# Patient Record
Sex: FEMALE | Race: ASIAN | Employment: FULL TIME | URBAN - METROPOLITAN AREA
[De-identification: names, ages, dates, MRNs, and addresses within clinical notes are randomized per-mention and may not be internally consistent; named-entity substitution may affect disease eponyms.]

---

## 2018-01-15 ENCOUNTER — OFFICE VISIT (OUTPATIENT)
Dept: INTERNAL MEDICINE CLINIC | Age: 38
End: 2018-01-15

## 2018-01-15 VITALS
BODY MASS INDEX: 33.13 KG/M2 | OXYGEN SATURATION: 98 % | DIASTOLIC BLOOD PRESSURE: 94 MMHG | WEIGHT: 187 LBS | HEIGHT: 63 IN | SYSTOLIC BLOOD PRESSURE: 149 MMHG | HEART RATE: 69 BPM | RESPIRATION RATE: 16 BRPM | TEMPERATURE: 98.5 F

## 2018-01-15 DIAGNOSIS — Z72.0 TOBACCO ABUSE: ICD-10-CM

## 2018-01-15 DIAGNOSIS — Z76.89 ESTABLISHING CARE WITH NEW DOCTOR, ENCOUNTER FOR: Primary | ICD-10-CM

## 2018-01-15 DIAGNOSIS — I10 ESSENTIAL HYPERTENSION, BENIGN: Chronic | ICD-10-CM

## 2018-01-15 DIAGNOSIS — H65.93 FLUID LEVEL BEHIND TYMPANIC MEMBRANE OF BOTH EARS: ICD-10-CM

## 2018-01-15 DIAGNOSIS — E11.8 TYPE 2 DIABETES MELLITUS WITH COMPLICATION, WITHOUT LONG-TERM CURRENT USE OF INSULIN (HCC): Chronic | ICD-10-CM

## 2018-01-15 DIAGNOSIS — R42 DIZZINESS: ICD-10-CM

## 2018-01-15 RX ORDER — FLUTICASONE PROPIONATE 50 MCG
2 SPRAY, SUSPENSION (ML) NASAL DAILY
Qty: 1 BOTTLE | Refills: 3 | Status: SHIPPED | OUTPATIENT
Start: 2018-01-15 | End: 2019-06-12

## 2018-01-15 RX ORDER — LISINOPRIL 10 MG/1
TABLET ORAL
Qty: 30 TAB | Refills: 5 | Status: SHIPPED | OUTPATIENT
Start: 2018-01-15

## 2018-01-15 RX ORDER — METFORMIN HYDROCHLORIDE 1000 MG/1
TABLET ORAL
COMMUNITY
Start: 2018-01-02 | End: 2018-01-15 | Stop reason: ALTCHOICE

## 2018-01-15 RX ORDER — GUAIFENESIN 600 MG/1
600 TABLET, EXTENDED RELEASE ORAL 2 TIMES DAILY
COMMUNITY
End: 2019-06-12

## 2018-01-15 NOTE — PROGRESS NOTES
Sebastien Kim is a 40 y.o. female who presents today for Establish Care and Dizziness (Started Tuesday the 9th. Comes and goes. Startes when she wakes up )  . She has a history of   Patient Active Problem List   Diagnosis Code    Type 2 diabetes mellitus with complication, without long-term current use of insulin [facility] E11.8    Essential hypertension, benign I10   . Today patient is here to establish care. No previous PCP since Dr. Yasmin Flores. Problem visit:  Sebastien Kim is here for complaint of dizziness. Problem began 10 day(s) ago. Severity is moderate  Character of problem: Noted that she woke up dizzy. Room was not spinning. No palpitations or feeling like heart was racing. Movement makes the problem worse as well as moving neck. Nothing makes the problem better, but this has improved with new pillow. Lasted for a couple hours and then resolved. Has changed her pillow and this has gotten better. Has had some congestion recently. No focal neurological issues. Diabetes: No longer taking victoza. Only on Metformin. Sees Dr. Wendie Henry for endo. Checks BG in the AM. Usually around 100. Notes that weight was better when on Victoza. May resume this once blood work comes back. HTN: on Labetalol due to pregnancy. Last child in 2015. No plans for more children. Pt's  has had vasectomy. Good control on ACEi previously. Social:  Smoking 2-3 cigs daily. Social EtOH. Works in IT. Two children, (7, 2)    ROS  Review of Systems   Constitutional: Negative for chills, fever and weight loss. HENT: Negative for ear discharge, ear pain, hearing loss and tinnitus. Eyes: Negative for blurred vision, double vision, photophobia, pain and discharge. Respiratory: Negative for cough, hemoptysis, sputum production and shortness of breath. Cardiovascular: Negative for chest pain, palpitations, orthopnea and leg swelling.    Gastrointestinal: Negative for abdominal pain, blood in stool, constipation, diarrhea, heartburn, nausea and vomiting. Genitourinary: Negative for dysuria, frequency, hematuria and urgency. Skin: Negative for itching and rash. Neurological: Positive for dizziness. Negative for tingling, tremors, sensory change, speech change, focal weakness and headaches. Endo/Heme/Allergies: Does not bruise/bleed easily. Psychiatric/Behavioral: Negative for depression. The patient is not nervous/anxious. Visit Vitals    BP (!) 149/94 (BP 1 Location: Left arm, BP Patient Position: Sitting)    Pulse 69    Temp 98.5 °F (36.9 °C) (Oral)    Resp 16    Ht 5' 3\" (1.6 m)    Wt 187 lb (84.8 kg)    SpO2 98%    BMI 33.13 kg/m2       Physical Exam   Constitutional: She is oriented to person, place, and time. She appears well-developed and well-nourished. HENT:   Head: Normocephalic and atraumatic. Fluid behind both TM. Cardiovascular: Normal rate and regular rhythm. No murmur heard. Pulmonary/Chest: Effort normal. No respiratory distress. Abdominal: Soft. Bowel sounds are normal. She exhibits no distension. There is no tenderness. Neurological: She is alert and oriented to person, place, and time. Virgil-hallpike negative. Skin: Skin is warm and dry. No open wounds. Skin normal.        Psychiatric: She has a normal mood and affect. Her behavior is normal.         Current Outpatient Prescriptions   Medication Sig    guaiFENesin ER (MUCINEX) 600 mg ER tablet Take 600 mg by mouth two (2) times a day.  fluticasone (FLONASE) 50 mcg/actuation nasal spray 2 Sprays by Both Nostrils route daily.  lisinopril (PRINIVIL, ZESTRIL) 10 mg tablet TAKE 1 TABLET BY MOUTH EVERY DAY    metFORMIN (GLUCOPHAGE) 1,000 mg tablet Take 1 Tab by mouth two (2) times daily (with meals) for 60 days.  FLUCELVAX QUAD 8239-7701, PF, syrg injection     metFORMIN (GLUCOPHAGE) 1,000 mg tablet Take 1 Tab by mouth two (2) times daily (with meals) for 30 days.      No current facility-administered medications for this visit. Past Medical History:   Diagnosis Date    Essential hypertension, benign 4/7/2011    Essential hypertension, benign     Type II or unspecified type diabetes mellitus without mention of complication, not stated as uncontrolled 4/7/2011      History reviewed. No pertinent surgical history. Social History   Substance Use Topics    Smoking status: Current Every Day Smoker     Types: Cigarettes    Smokeless tobacco: Never Used    Alcohol use Yes      Comment: occasionally      Family History   Problem Relation Age of Onset    Diabetes Mother     Heart Disease Mother     Hypertension Mother     Diabetes Father     Diabetes Brother     Cancer Maternal Aunt     Cancer Maternal Grandmother         No Known Allergies     Assessment/Plan  Diagnoses and all orders for this visit:    1. Establishing care with new doctor, encounter for - have reviewed some of her old records. 2. Type 2 diabetes mellitus with complication, without long-term current use of insulin ([facility] - notes good fasting. Only on metformin. Check blood work today. Will fax to Dr. Isaiah Thao.   -     LIPID PANEL  -     METABOLIC PANEL, COMPREHENSIVE  -     CBC WITH AUTOMATED DIFF  -     HEMOGLOBIN A1C WITH EAG  -     MICROALBUMIN, UR, RAND W/ MICROALBUMIN/CREA RATIO    3. Essential hypertension, benign - above goal. Done having children. Will switch back to ACEi. Stop BB. -     lisinopril (PRINIVIL, ZESTRIL) 10 mg tablet; TAKE 1 TABLET BY MOUTH EVERY DAY  -     METABOLIC PANEL, COMPREHENSIVE  -     CBC WITH AUTOMATED DIFF    4. Tobacco abuse - counseled. 5. Dizziness - no red flags. [place] Hallpike negative. + fluid behind TM. Try nasal steroids and mucinex. Stop BB. -     fluticasone (FLONASE) 50 mcg/actuation nasal spray; 2 Sprays by Both Nostrils route daily.  -     METABOLIC PANEL, COMPREHENSIVE  -     CBC WITH AUTOMATED DIFF  -     TSH 3RD GENERATION    6.  Fluid level behind tympanic membrane of both ears  -     fluticasone (FLONASE) 50 mcg/actuation nasal spray; 2 Sprays by Both Nostrils route daily.  -     METABOLIC PANEL, COMPREHENSIVE  -     CBC WITH AUTOMATED DIFF  -     TSH 3RD GENERATION        Follow-up Disposition: Not on File    Madi Velez MD  1/15/2018

## 2018-01-15 NOTE — PROGRESS NOTES
1. Have you been to the ER, urgent care clinic since your last visit? Hospitalized since your last visit? No    2. Have you seen or consulted any other health care providers outside of the [address] [place] [address] since your last visit? Include any pap smears or colon screening. Dr Huong Mcclain for Endocrinology. Dr. Rosario Ask for Eyes. Dr Kenna Oneill with [facility] for OBGYN, last PAP was 2016.

## 2018-01-15 NOTE — MR AVS SNAPSHOT
Visit Information Date & Time Provider Department Dept. Phone Encounter #  
 1/15/2018  9:30 AM Vance Do MD [facility] [address] [id number] Upcoming Health Maintenance Date Due MICROALBUMIN Q1 1/19/1990 Pneumococcal 19-64 Medium Risk (1 of 1 - PPSV23) 1/19/1999 LIPID PANEL Q1 4/29/2014 HEMOGLOBIN A1C Q6M 5/18/2014 FOOT EXAM Q1 9/16/2015 PAP AKA CERVICAL CYTOLOGY 8/14/2016 EYE EXAM RETINAL OR DILATED Q1 11/3/2018 DTaP/Tdap/Td series (2 - Td) 6/17/2024 Allergies as of 1/15/2018  Review Complete On: 5/91/2907 By: Ammon Triana No Known Allergies Current Immunizations  Reviewed on 6/17/2014 Name Date Influenza Vaccine 11/20/2013 Tdap 6/17/2014 Not reviewed this visit You Were Diagnosed With   
  
 Codes Comments Establishing care with new doctor, encounter for    -  Primary ICD-10-CM: Z76.89 
ICD-9-CM: V65.8 Type 2 diabetes mellitus with complication, without long-term current use of insulin (HCC)     ICD-10-CM: E11.8 ICD-9-CM: 250.90 Essential hypertension, benign     ICD-10-CM: I10 
ICD-9-CM: 401.1 Tobacco abuse     ICD-10-CM: Z72.0 ICD-9-CM: 305.1 Dizziness     ICD-10-CM: R08 ICD-9-CM: 780.4 Fluid level behind tympanic membrane of both ears     ICD-10-CM: H65.93 
ICD-9-CM: 381. 4 Vitals BP Pulse Temp Resp Height(growth percentile) Weight(growth percentile) (!) 149/94 (BP 1 Location: Left arm, BP Patient Position: Sitting) 69 98.5 °F (36.9 °C) (Oral) 16 5' 3\" (1.6 m) 187 lb (84.8 kg) LMP SpO2 BMI OB Status Smoking Status 01/04/2018 98% 33.13 kg/m2 Having regular periods Current Every Day Smoker Vitals History BMI and BSA Data Body Mass Index Body Surface Area  
 33.13 kg/m 2 1.94 m 2 Preferred Pharmacy Pharmacy Name Phone [id number], [address] [facility] [phone] Your Updated Medication List  
  
   
This list is accurate as of: 1/15/18 10:14 AM.  Always use your most recent med list.  
  
  
  
  
 FLUCELVAX QUAD 8946-2386 (PF) Syrg injection Generic drug:  influenza vaccine - (4 yrs+)(PF)  
  
 fluticasone 50 mcg/actuation nasal spray Commonly known as:  Estrada Remedies 2 Sprays by Both Nostrils route daily. lisinopril 10 mg tablet Commonly known as:  PRINIVIL, ZESTRIL  
TAKE 1 TABLET BY MOUTH EVERY DAY  
  
 * metFORMIN 1,000 mg tablet Commonly known as:  GLUCOPHAGE Take 1 Tab by mouth two (2) times daily (with meals) for 60 days. * metFORMIN 1,000 mg tablet Commonly known as:  GLUCOPHAGE Take 1 Tab by mouth two (2) times daily (with meals) for 30 days. MUCINEX 600 mg ER tablet Generic drug:  guaiFENesin ER Take 600 mg by mouth two (2) times a day. * Notice: This list has 2 medication(s) that are the same as other medications prescribed for you. Read the directions carefully, and ask your doctor or other care provider to review them with you. Prescriptions Sent to Pharmacy Refills  
 fluticasone (FLONASE) 50 mcg/actuation nasal spray 3 Si Sprays by Both Nostrils route daily. Class: Normal  
 Pharmacy: [facility] [address] Ph #: [phone] Route: Both Nostrils  
 lisinopril (PRINIVIL, ZESTRIL) 10 mg tablet 5 Sig: TAKE 1 TABLET BY MOUTH EVERY DAY Class: Normal  
 Pharmacy: [facility] [address] Ph #: [phone] We Performed the Following CBC WITH AUTOMATED DIFF [90084 CPT(R)] HEMOGLOBIN A1C WITH EAG [72309 CPT(R)] LIPID PANEL [77549 CPT(R)] METABOLIC PANEL, COMPREHENSIVE [60850 CPT(R)] MICROALBUMIN, UR, RAND W/ MICROALBUMIN/CREA RATIO [id number] CPT(R)] TSH 3RD GENERATION [63159 CPT(R)] Introducing [facility]!    
 Dear Bella Juarez: 
 Thank you for requesting a [employer] account. Our records indicate that you already have an active [employer] account. You can access your account anytime at [email or web address] Did you know that you can access your hospital and ER discharge instructions at any time in [employer]? You can also review all of your test results from your hospital stay or ER visit. Additional Information If you have questions, please visit the Frequently Asked Questions section of the [employer] website at [email or web address] Remember, [employer] is NOT to be used for urgent needs. For medical emergencies, dial 911. Now available from your iPhone and Android! Please provide this summary of care documentation to your next provider. Your primary care clinician is listed as Wallace Felix. If you have any questions after today's visit, please call [phone].

## 2018-01-16 LAB
ALBUMIN SERPL-MCNC: 4.1 G/DL (ref 3.5–5.5)
ALBUMIN/GLOB SERPL: 1.4 {RATIO} (ref 1.2–2.2)
ALP SERPL-CCNC: 40 IU/L (ref 39–117)
ALT SERPL-CCNC: 16 IU/L (ref 0–32)
AST SERPL-CCNC: 18 IU/L (ref 0–40)
BASOPHILS # BLD AUTO: 0 X10E3/UL (ref 0–0.2)
BASOPHILS NFR BLD AUTO: 1 %
BILIRUB SERPL-MCNC: 0.3 MG/DL (ref 0–1.2)
BUN SERPL-MCNC: 12 MG/DL (ref 6–20)
BUN/CREAT SERPL: 26 (ref 9–23)
CALCIUM SERPL-MCNC: 9.6 MG/DL (ref 8.7–10.2)
CHLORIDE SERPL-SCNC: 98 MMOL/L (ref 96–106)
CHOLEST SERPL-MCNC: 232 MG/DL (ref 100–199)
CO2 SERPL-SCNC: 25 MMOL/L (ref 18–29)
CREAT SERPL-MCNC: 0.46 MG/DL (ref 0.57–1)
EOSINOPHIL # BLD AUTO: 0.2 X10E3/UL (ref 0–0.4)
EOSINOPHIL NFR BLD AUTO: 3 %
ERYTHROCYTE [DISTWIDTH] IN BLOOD BY AUTOMATED COUNT: 16.1 % (ref 12.3–15.4)
EST. AVERAGE GLUCOSE BLD GHB EST-MCNC: 134 MG/DL
GLOBULIN SER CALC-MCNC: 3 G/DL (ref 1.5–4.5)
GLUCOSE SERPL-MCNC: 127 MG/DL (ref 65–99)
HBA1C MFR BLD: 6.3 % (ref 4.8–5.6)
HCT VFR BLD AUTO: 34.6 % (ref 34–46.6)
HDLC SERPL-MCNC: 62 MG/DL
HGB BLD-MCNC: 10.7 G/DL (ref 11.1–15.9)
IMM GRANULOCYTES # BLD: 0 X10E3/UL (ref 0–0.1)
IMM GRANULOCYTES NFR BLD: 0 %
INTERPRETATION, 910389: NORMAL
LDLC SERPL CALC-MCNC: 142 MG/DL (ref 0–99)
LYMPHOCYTES # BLD AUTO: 2.8 X10E3/UL (ref 0.7–3.1)
LYMPHOCYTES NFR BLD AUTO: 41 %
Lab: NORMAL
MCH RBC QN AUTO: 20.3 PG (ref 26.6–33)
MCHC RBC AUTO-ENTMCNC: 30.9 G/DL (ref 31.5–35.7)
MCV RBC AUTO: 66 FL (ref 79–97)
MONOCYTES # BLD AUTO: 0.4 X10E3/UL (ref 0.1–0.9)
MONOCYTES NFR BLD AUTO: 6 %
NEUTROPHILS # BLD AUTO: 3.4 X10E3/UL (ref 1.4–7)
NEUTROPHILS NFR BLD AUTO: 49 %
PLATELET # BLD AUTO: 182 X10E3/UL (ref 150–379)
POTASSIUM SERPL-SCNC: 4.2 MMOL/L (ref 3.5–5.2)
PROT SERPL-MCNC: 7.1 G/DL (ref 6–8.5)
RBC # BLD AUTO: 5.28 X10E6/UL (ref 3.77–5.28)
SODIUM SERPL-SCNC: 140 MMOL/L (ref 134–144)
TRIGL SERPL-MCNC: 140 MG/DL (ref 0–149)
TSH SERPL DL<=0.005 MIU/L-ACNC: 1.19 UIU/ML (ref 0.45–4.5)
VLDLC SERPL CALC-MCNC: 28 MG/DL (ref 5–40)
WBC # BLD AUTO: 6.8 X10E3/UL (ref 3.4–10.8)

## 2019-06-12 ENCOUNTER — OFFICE VISIT (OUTPATIENT)
Dept: INTERNAL MEDICINE CLINIC | Age: 39
End: 2019-06-12

## 2019-06-12 VITALS
SYSTOLIC BLOOD PRESSURE: 127 MMHG | RESPIRATION RATE: 18 BRPM | BODY MASS INDEX: 33.13 KG/M2 | DIASTOLIC BLOOD PRESSURE: 84 MMHG | OXYGEN SATURATION: 97 % | HEIGHT: 63 IN | TEMPERATURE: 98.3 F | HEART RATE: 104 BPM

## 2019-06-12 DIAGNOSIS — E11.8 TYPE 2 DIABETES MELLITUS WITH COMPLICATION, WITHOUT LONG-TERM CURRENT USE OF INSULIN (HCC): Primary | ICD-10-CM

## 2019-06-12 DIAGNOSIS — G47.9 SLEEP DISORDER: ICD-10-CM

## 2019-06-12 DIAGNOSIS — I10 ESSENTIAL HYPERTENSION, BENIGN: ICD-10-CM

## 2019-06-12 RX ORDER — TRAZODONE HYDROCHLORIDE 50 MG/1
50 TABLET ORAL
Qty: 60 TAB | Refills: 1 | Status: SHIPPED | OUTPATIENT
Start: 2019-06-12 | End: 2021-12-02 | Stop reason: ALTCHOICE

## 2019-06-12 RX ORDER — UREA 10 %
LOTION (ML) TOPICAL
COMMUNITY
End: 2021-12-02 | Stop reason: ALTCHOICE

## 2019-06-12 RX ORDER — MELOXICAM 15 MG/1
15 TABLET ORAL DAILY
COMMUNITY
End: 2021-12-02 | Stop reason: ALTCHOICE

## 2019-06-12 RX ORDER — OXYCODONE HYDROCHLORIDE 10 MG/1
TABLET ORAL
COMMUNITY
End: 2021-12-02 | Stop reason: ALTCHOICE

## 2019-06-12 NOTE — PROGRESS NOTES
HISTORY OF PRESENT ILLNESS  Aliyah Lee is a 44 y.o. female. HPI   Sleeping: Pt reports she slipped at a concert and tore her ACL and meniscus in February. She found out in April they were torn, and had a surgical procedure to fix them on May 2nd. She notes she has not been able to sleep since then. She only takes Oxycodone PRN at night, and Meloxicam to help with inflammation. Pt reports that she has been using melatonin to fall asleep, but she will wake in the middle of the night with minimal pain. She would have to take an oxycodone to help her sleep but would rather not have to take a narcotic to help her sleep. Hypertension ROS: taking medications as instructed, no medication side effects noted, no TIA's, no chest pain on exertion/SOB, no dyspnea on exertion, no swelling of ankles. New concerns: BP in office today is 127/84. Diabetic Review of Systems - medication compliance: compliant all of the time, diabetic diet compliance: compliant most of the time, home glucose monitoring: is performed regularly, further diabetic ROS: no polyuria or polydipsia, no chest pain, dyspnea or TIA's, no numbness, tingling or pain in extremities. Other symptoms and concerns: Pt's last A1C was 5.3, but she thinks her A1C will be higher because she cannot cook healthy meals herself and relies on her mother or  to cook them for her. Her BS last week was 110. Review of Systems   All other systems reviewed and are negative. Physical Exam   Constitutional: She is oriented to person, place, and time. She appears well-developed and well-nourished. HENT:   Head: Normocephalic and atraumatic. Right Ear: External ear normal.   Left Ear: External ear normal.   Nose: Nose normal.   Mouth/Throat: Oropharynx is clear and moist.   Eyes: Pupils are equal, round, and reactive to light. Conjunctivae and EOM are normal.   Neck: Normal range of motion. Neck supple.    Cardiovascular: Normal rate, regular rhythm, normal heart sounds and intact distal pulses. Pulmonary/Chest: Effort normal and breath sounds normal. Right breast exhibits no inverted nipple, no mass, no nipple discharge, no skin change and no tenderness. Left breast exhibits no inverted nipple, no mass, no nipple discharge, no skin change and no tenderness. No breast swelling, tenderness, discharge or bleeding. Breasts are symmetrical.   Abdominal: Soft. Bowel sounds are normal.   Genitourinary: Rectum normal and vagina normal. Rectal exam shows anal tone normal and guaiac negative stool. No breast swelling, tenderness, discharge or bleeding. Musculoskeletal:   Right leg in brace    Neurological: She is alert and oriented to person, place, and time. Skin: Skin is warm and dry. Psychiatric: She has a normal mood and affect. Her behavior is normal. Judgment and thought content normal.   Nursing note and vitals reviewed. ASSESSMENT and PLAN  Diagnoses and all orders for this visit:    1. Type 2 diabetes mellitus with complication, without long-term current use of insulin ([facility])  Sugars stable. Continue to follow diabetic diet and monitor sugars. 2. Essential hypertension, benign  BP is at goal. I do not recommend any change in medications. 3. Sleep disorder  Prescribed Trazodone. Dicussed that Ambiens possible side effects (sleep walking) would be dangerous for the pt to do post surgery, so we decided on Trazodone. -     traZODone (DESYREL) 50 mg tablet; Take 1 Tab by mouth nightly. 1-2 at night     Additional comments: Pt notes that she is no longer smoking anymore (since her surgery). Lab results and schedule of future lab studies reviewed with patient. Reviewed diet, exercise and weight control. Written by Antonio Galaviz, as dictated by Jessica Alicea MD.     Current diagnosis and concerns discussed with pt at length.  Understands risks and benefits or current treatment plan and medications and accepts the treatment and medication with any possible risks. Pt asks appropriate questions which were answered. Pt instructed to call with any concerns or problems. This note will not be viewable in [address]

## 2020-02-06 LAB — A1C %: 6.7 %

## 2021-08-16 NOTE — PROGRESS NOTES
Allen Mccain is a 39 y.o. female here for new patient appt for breast cancer. Referred by Dr. Spencer Miller    1. Have you been to the ER, urgent care clinic since your last visit? Hospitalized since your last visit? New Pt    2. Have you seen or consulted any other health care providers outside of the [address] [place] [address] since your last visit? Include any pap smears or colon screening.  New Pt

## 2021-08-17 ENCOUNTER — OFFICE VISIT (OUTPATIENT)
Dept: ONCOLOGY | Age: 41
End: 2021-08-17
Payer: COMMERCIAL

## 2021-08-17 ENCOUNTER — DOCUMENTATION ONLY (OUTPATIENT)
Dept: ONCOLOGY | Age: 41
End: 2021-08-17

## 2021-08-17 VITALS
HEIGHT: 63 IN | WEIGHT: 167.2 LBS | BODY MASS INDEX: 29.62 KG/M2 | TEMPERATURE: 98.4 F | DIASTOLIC BLOOD PRESSURE: 91 MMHG | OXYGEN SATURATION: 100 % | HEART RATE: 75 BPM | SYSTOLIC BLOOD PRESSURE: 144 MMHG

## 2021-08-17 DIAGNOSIS — D05.12 DUCTAL CARCINOMA IN SITU (DCIS) OF LEFT BREAST: Primary | ICD-10-CM

## 2021-08-17 PROCEDURE — 99244 OFF/OP CNSLTJ NEW/EST MOD 40: CPT | Performed by: INTERNAL MEDICINE

## 2021-08-17 RX ORDER — METFORMIN HYDROCHLORIDE 1000 MG/1
1000 TABLET ORAL 2 TIMES DAILY WITH MEALS
COMMUNITY

## 2021-08-17 RX ORDER — TAMOXIFEN CITRATE 10 MG/1
10 TABLET, FILM COATED ORAL EVERY OTHER DAY
Qty: 45 TABLET | Refills: 3 | Status: SHIPPED | OUTPATIENT
Start: 2021-08-17 | End: 2022-08-31

## 2021-08-17 NOTE — PROGRESS NOTES
[facility] at [address], [address] [place] [address]  W: [phone]  F: [phone]      Reason for Visit:   Chrissy Boss is a 39 y.o. female who is seen in consultation at the request of Dr. Carolyn Aly for evaluation of DCIS    Rad onc:  Dr. Kaylee Templeton    Treatment History:   · 21 L breast core bx:  DCIS, solid type, 1 mm, gr 2, ER + at 99%, NH + at 99%  · 7/15/21 L breast lumpectomy:  DCIS, 5-10 mm, gr 2, pTis pNx cM0  · 21 invitae genetic testing:  VUS MSH3; VUS SDHA    History of Present Illness: An abnormal mammogram led to the pathology above    FH:  Maternal aunt with breast cancer, paternal cousin with breast cancer; no ovarian, prostate, pancreas cancer    Past Medical History:   Diagnosis Date    Cancer [facility])     breast cancer    Essential hypertension, benign 2011    Essential hypertension, benign     Type II or unspecified type diabetes mellitus without mention of complication, not stated as uncontrolled 2011      History reviewed. No pertinent surgical history. Social History     Tobacco Use    Smoking status: Former Smoker     Types: Cigarettes     Quit date: 2019     Years since quittin.6    Smokeless tobacco: Never Used   Substance Use Topics    Alcohol use: Yes     Comment: occasionally      Family History   Problem Relation Age of Onset    Diabetes Mother     Heart Disease Mother     Hypertension Mother     Diabetes Father     Diabetes Brother     Cancer Maternal Aunt     Cancer Maternal Grandmother      Current Outpatient Medications   Medication Sig    metFORMIN (GLUCOPHAGE) 1,000 mg tablet Take 1,000 mg by mouth two (2) times daily (with meals).  tamoxifen (NOLVADEX) 10 mg tablet Take 1 Tablet by mouth every other day.  liraglutide (VICTOZA 2-FERNANDO) 0.6 mg/0.1 mL (18 mg/3 mL) pnij 0.6 mg by SubCUTAneous route.     lisinopril (PRINIVIL, ZESTRIL) 10 mg tablet TAKE 1 TABLET BY MOUTH EVERY DAY    melatonin 1 mg tablet Take  by mouth. (Patient not taking: Reported on 8/17/2021)    meloxicam (MOBIC) 15 mg tablet Take 15 mg by mouth daily. (Patient not taking: Reported on 8/17/2021)    oxyCODONE IR (ROXICODONE) 10 mg tab immediate release tablet Take  by mouth. (Patient not taking: Reported on 8/17/2021)    traZODone (DESYREL) 50 mg tablet Take 1 Tab by mouth nightly. 1-2 at night (Patient not taking: Reported on 8/17/2021)    metFORMIN (GLUCOPHAGE) 1,000 mg tablet Take 1 Tab by mouth two (2) times daily (with meals) for 30 days.  metFORMIN (GLUCOPHAGE) 1,000 mg tablet Take 1 Tab by mouth two (2) times daily (with meals) for 60 days. No current facility-administered medications for this visit. No Known Allergies     Review of Systems: A complete review of systems was obtained, negative except as described above.     Physical Exam:     Visit Vitals  BP (!) 144/91 (BP 1 Location: Left upper arm, BP Patient Position: Sitting)   Pulse 75   Temp 98.4 °F (36.9 °C) (Temporal)   Ht 5' 3\" (1.6 m)   Wt 167 lb 3.2 oz (75.8 kg)   SpO2 100%   BMI 29.62 kg/m²     ECOG PS: 0    Constitutional: Appears well-developed and well-nourished in no apparent distress      Mental status: Alert and awake, Oriented to person/place/time, Able to follow commands    Eyes: EOM normal, Sclera normal, No visible ocular discharge    HENT: Normocephalic, atraumatic    Mouth/Throat: Moist mucous membranes    External Ears normal    Neck: No visualized mass    Pulmonary/Chest: Respiratory effort normal, No visualized signs of difficulty breathing or respiratory distress    Musculoskeletal: Normal gait with no signs of ataxia, Normal range of motion of neck    Neurological: No facial asymmetry (Cranial nerve 7 motor function), No gaze palsy    Skin: No significant exanthematous lesions or discoloration noted on facial skin    Psychiatric: Normal affect, No hallucinations           Results:     Lab Results   Component Value Date/Time WBC 6.8 01/15/2018 12:00 AM    HGB 10.7 (L) 01/15/2018 12:00 AM    HCT 34.6 01/15/2018 12:00 AM    PLATELET 454 28/78/4632 12:00 AM    MCV 66 (L) 01/15/2018 12:00 AM    ABS. NEUTROPHILS 3.4 01/15/2018 12:00 AM     Lab Results   Component Value Date/Time    Sodium 140 01/15/2018 12:00 AM    Potassium 4.2 01/15/2018 12:00 AM    Chloride 98 01/15/2018 12:00 AM    CO2 25 01/15/2018 12:00 AM    Glucose 127 (H) 01/15/2018 12:00 AM    BUN 12 01/15/2018 12:00 AM    Creatinine 0.46 (L) 01/15/2018 12:00 AM    GFR est  01/15/2018 12:00 AM    GFR est non- 01/15/2018 12:00 AM    Calcium 9.6 01/15/2018 12:00 AM     Lab Results   Component Value Date/Time    Bilirubin, total 0.3 01/15/2018 12:00 AM    ALT (SGPT) 16 01/15/2018 12:00 AM    Alk. phosphatase 40 01/15/2018 12:00 AM    Protein, total 7.1 01/15/2018 12:00 AM    Albumin 4.1 01/15/2018 12:00 AM     6/11/21 MRI breast  10 mm area in L lateral breast, 8:00, 6cmfn    Records reviewed and summarized above. Pathology report(s) reviewed above. Radiology report(s) reviewed above. Assessment/plan:   1. Left DCIS, stage 0, ER +, MT +, grade 2:    The major issue for our consultation today was the use of tamoxifen in patients with DCIS that expresses estrogen and/or progesterone receptors. The following was discussed. The [employer] (nccn.org) guidelines suggest consideration of tamoxifen for women with DCIS treated with lumpectomy and radiation and who have hormone-receptor-positive tumors. Randomized trials in patients with DCIS suggest that tamoxifen would further reduce the risk of in-breast recurrence by about 30-40% in this patient, and decrease this patient's risk of contralateral breast cancer by about half. At present and from the literature, the risk of in-breast recurrence after radiation would probably be about 5-10 % at 10 years. Tamoxifen would decrease this risk by 30-40%.  Her risk of contralateral breast cancer is 0.5-1% a year, and tamoxifen would also decrease that risk by about half. About half of recurrences are invasive and half DCIS. However, from the data available, tamoxifen has not been associated with an improvement in survival.     Also, discussed the [facility] 2018 data with tamoxifen 10 mg every other day for 3 years with similar results to historical data. After this discussion, she is agreeable to tamoxifen 10 mg every other day, rx in. Follow-up after early breast cancer was discussed. I recommend follow-up as defined by the American Society of Clinical Oncology and [facility]. This includes a visit to a health care professional every 3-6 months for 3 years, then every 6-12 months for 2 years, and then yearly as well as mammograms yearly. 2. Emotional well being:  She has excellent support and is coping well with her disease    3. VUS MSH3 and SDHA:  Not clinically relevant at this time, has seen genetics    S/p covid19 vaccination    I appreciate the opportunity to participate in Ms. Gena Ibrahim's care. Signed By: Wilton Jolley MD      No orders of the defined types were placed in this encounter.

## 2021-08-17 NOTE — PROGRESS NOTES
Oncology Social Work  Psychosocial Assessment    Reason for Assessment:      [] Social Work Referral [x] Initial Assessment [x] New Diagnosis [] Other    Advance Care Planning: no amd  No flowsheet data found. Sources of Information:    [x]Patient  []Family  []Staff  []Medical Record    Mental Status:    [x]Alert  []Lethargic  []Unresponsive   [] Unable to assess   Oriented to:  [x]Person  [x]Place  [x]Time  [x]Situation      Relationship Status:  []Single  []  []Significant Other/Life Partner  []  [x]  []    Living Circumstances:  []Lives Alone  [x]Family/Significant Other in Household  []Roommates  [x]Children in the Home  []Paid Caregivers  []Assisted Living Facility/Group Home  []Skilled [address]  []Homeless  []Incarcerated  []Environmental/Care Concerns  []Other:    Employment Status:  [x]Employed Full-time []Employed Part-time []Homemaker  [] Retired [] Short-Term Disability [] [facility]  [] Unemployed     Barriers to Learning:    []Language  []Developmental  []Cognitive  []Altered Mental Status  []Visual/Hearing Impairment  []Unable to Read/Write  []Motivational  [] Challenges Understanding Medical Jargon [x]No Barriers Identified      Financial/Legal Concerns:    []Uninsured  []Limited Income/Resources  []Non-Citizen  []Food Insecurity [x]No Concerns Identified   []Other:    Shinto/Spiritual/Existential:  Does patient have any spiritual or Tenriism beliefs? [x] Yes [] No  Is the patient involved in a spiritual, ellie or Tenriism community?  [x] Yes [] No  Patient expressing spiritual/existential angst? [] Yes [x] No  Notes:    Support System:    Identified Support Person/Group: parents & siblings  [x]Strong  []Fair  []Limited    Coping with Illness:   [x]  Coping Well  [] Challenges Coping with Serious Illness [] Situational Depression [x] Situational Anxiety [] Anticipatory Grief  [] Recent Loss [] Caregiver [place]            Narrative: Patient here for consultation with Dr. Lincoln Jordan for the management of DCIS. Met with patient to introduce social work role and supports. She presents as pleasant, engaged and forthcoming with information. Patient and her children (ages 10 & 6) live temporarily with her parents in their private residence. She is . She works full-time as a . She has health insurance and prescription coverage. Provided active listening as patient ventilates feelings regarding her breast cancer diagnosis. She endorsed some anxiety and depression due to several life stressors including her breast cancer diagnosis, martial conflict and temporary living situation. She feels well supported by her parents and siblings with practical and emotional needs. She participates in individual psychotherapy. Her primary coping tools is exercise. Explored support groups for peer support as patient endorsed feelings of loneliness. Provided new patient folder with information on cancer support resources and services. Patient identified feeling like her life is \"on hold\". She is hopeful her appointment with Dr. Lincoln Jordan will provide some clarity and expectations regarding long term management. No ongoing barriers identified at this time. Plan: Ongoing psychosocial support as desired by patient.     Referral/Handouts:      Complementary therapies referral  Support Groups referral    Thank you,  Arvind Moreno LCSW

## 2021-11-23 ENCOUNTER — TELEPHONE (OUTPATIENT)
Dept: ONCOLOGY | Age: 41
End: 2021-11-23

## 2021-11-23 NOTE — TELEPHONE ENCOUNTER
[address] at [place] [address]  [phone]    11/23/2021--This user called and spoke with the patient, she stated that she has been taking the Tamoxifen for about 1 1/2 months and started noticing the \"puffiness\" in the past couple of weeks. Patient reported her ankles are swollen (Not her feet or toes) and there is some puffiness in her face and wrists. Patient stated that it is not causing her any issues, and that when she gets up in the morning she is fine, there is no swelling. Patient wants to know if she can take an OTC fluid pill for the retention. I let her know that I would consult with our team and give her a call back. Patient verbalized understanding. 11/23/2021 at 4:34 pm--This user attempted to call patient but she did not answer, so I left a detailed message with our recommendation's.

## 2021-11-23 NOTE — TELEPHONE ENCOUNTER
Patient called and stated she has been taking tamoxifen for a couple months and since starting this medication she has been having issues with water retention.  Please advise         CB# [phone]

## 2021-12-02 ENCOUNTER — OFFICE VISIT (OUTPATIENT)
Dept: INTERNAL MEDICINE CLINIC | Age: 41
End: 2021-12-02
Payer: COMMERCIAL

## 2021-12-02 VITALS
HEART RATE: 82 BPM | HEIGHT: 63 IN | RESPIRATION RATE: 14 BRPM | DIASTOLIC BLOOD PRESSURE: 88 MMHG | SYSTOLIC BLOOD PRESSURE: 138 MMHG | TEMPERATURE: 97.1 F | WEIGHT: 162 LBS | OXYGEN SATURATION: 99 % | BODY MASS INDEX: 28.7 KG/M2

## 2021-12-02 DIAGNOSIS — I10 ESSENTIAL HYPERTENSION, BENIGN: Chronic | ICD-10-CM

## 2021-12-02 DIAGNOSIS — M79.10 MYALGIA: ICD-10-CM

## 2021-12-02 DIAGNOSIS — R53.83 FATIGUE, UNSPECIFIED TYPE: ICD-10-CM

## 2021-12-02 DIAGNOSIS — R45.86 MOOD CHANGES: ICD-10-CM

## 2021-12-02 DIAGNOSIS — D05.12 DUCTAL CARCINOMA IN SITU (DCIS) OF LEFT BREAST: ICD-10-CM

## 2021-12-02 DIAGNOSIS — Z23 NEEDS FLU SHOT: ICD-10-CM

## 2021-12-02 DIAGNOSIS — E11.8 TYPE 2 DIABETES MELLITUS WITH COMPLICATION, WITHOUT LONG-TERM CURRENT USE OF INSULIN (HCC): ICD-10-CM

## 2021-12-02 DIAGNOSIS — Z00.00 WELLNESS EXAMINATION: Primary | ICD-10-CM

## 2021-12-02 LAB
ALBUMIN SERPL-MCNC: 3.7 G/DL (ref 3.5–5)
ALBUMIN/GLOB SERPL: 1 {RATIO} (ref 1.1–2.2)
ALP SERPL-CCNC: 31 U/L (ref 45–117)
ALT SERPL-CCNC: 24 U/L (ref 12–78)
ANION GAP SERPL CALC-SCNC: 9 MMOL/L (ref 5–15)
AST SERPL-CCNC: 14 U/L (ref 15–37)
BASOPHILS # BLD: 0.1 K/UL (ref 0–0.1)
BASOPHILS NFR BLD: 1 % (ref 0–1)
BILIRUB SERPL-MCNC: 0.4 MG/DL (ref 0.2–1)
BUN SERPL-MCNC: 11 MG/DL (ref 6–20)
BUN/CREAT SERPL: 20 (ref 12–20)
CALCIUM SERPL-MCNC: 9.2 MG/DL (ref 8.5–10.1)
CHLORIDE SERPL-SCNC: 105 MMOL/L (ref 97–108)
CO2 SERPL-SCNC: 26 MMOL/L (ref 21–32)
CREAT SERPL-MCNC: 0.56 MG/DL (ref 0.55–1.02)
CRP SERPL-MCNC: <0.29 MG/DL (ref 0–0.6)
DIFFERENTIAL METHOD BLD: ABNORMAL
EOSINOPHIL # BLD: 0.1 K/UL (ref 0–0.4)
EOSINOPHIL NFR BLD: 1 % (ref 0–7)
ERYTHROCYTE [DISTWIDTH] IN BLOOD BY AUTOMATED COUNT: 15.6 % (ref 11.5–14.5)
ERYTHROCYTE [SEDIMENTATION RATE] IN BLOOD: 10 MM/HR (ref 0–20)
GLOBULIN SER CALC-MCNC: 3.7 G/DL (ref 2–4)
GLUCOSE SERPL-MCNC: 105 MG/DL (ref 65–100)
HCT VFR BLD AUTO: 36.7 % (ref 35–47)
HGB BLD-MCNC: 11.3 G/DL (ref 11.5–16)
IMM GRANULOCYTES # BLD AUTO: 0 K/UL (ref 0–0.04)
IMM GRANULOCYTES NFR BLD AUTO: 0 % (ref 0–0.5)
LYMPHOCYTES # BLD: 2 K/UL (ref 0.8–3.5)
LYMPHOCYTES NFR BLD: 35 % (ref 12–49)
MCH RBC QN AUTO: 21.4 PG (ref 26–34)
MCHC RBC AUTO-ENTMCNC: 30.8 G/DL (ref 30–36.5)
MCV RBC AUTO: 69.6 FL (ref 80–99)
MONOCYTES # BLD: 0.3 K/UL (ref 0–1)
MONOCYTES NFR BLD: 6 % (ref 5–13)
NEUTS SEG # BLD: 3.2 K/UL (ref 1.8–8)
NEUTS SEG NFR BLD: 57 % (ref 32–75)
NRBC # BLD: 0 K/UL (ref 0–0.01)
NRBC BLD-RTO: 0 PER 100 WBC
PLATELET # BLD AUTO: 181 K/UL (ref 150–400)
POTASSIUM SERPL-SCNC: 4 MMOL/L (ref 3.5–5.1)
PROT SERPL-MCNC: 7.4 G/DL (ref 6.4–8.2)
RBC # BLD AUTO: 5.27 M/UL (ref 3.8–5.2)
RBC MORPH BLD: ABNORMAL
SODIUM SERPL-SCNC: 140 MMOL/L (ref 136–145)
TSH SERPL DL<=0.05 MIU/L-ACNC: 0.97 UIU/ML (ref 0.36–3.74)
WBC # BLD AUTO: 5.7 K/UL (ref 3.6–11)

## 2021-12-02 PROCEDURE — 99396 PREV VISIT EST AGE 40-64: CPT | Performed by: INTERNAL MEDICINE

## 2021-12-02 PROCEDURE — 90686 IIV4 VACC NO PRSV 0.5 ML IM: CPT | Performed by: INTERNAL MEDICINE

## 2021-12-02 PROCEDURE — 90471 IMMUNIZATION ADMIN: CPT | Performed by: INTERNAL MEDICINE

## 2021-12-02 NOTE — PATIENT INSTRUCTIONS
Well Visit, Ages 25 to 48: Care Instructions  Overview     Well visits can help you stay healthy. Your doctor has checked your overall health and may have suggested ways to take good care of yourself. Your doctor also may have recommended tests. At home, you can help prevent illness with healthy eating, regular exercise, and other steps. Follow-up care is a key part of your treatment and safety. Be sure to make and go to all appointments, and call your doctor if you are having problems. It's also a good idea to know your test results and keep a list of the medicines you take. How can you care for yourself at home? · Get screening tests that you and your doctor decide on. Screening helps find diseases before any symptoms appear. · Eat healthy foods. Choose fruits, vegetables, whole grains, protein, and low-fat dairy foods. Limit fat, especially saturated fat. Reduce salt in your diet. · Limit alcohol. If you are a man, have no more than 2 drinks a day or 14 drinks a week. If you are a woman, have no more than 1 drink a day or 7 drinks a week. · Get at least 30 minutes of physical activity on most days of the week. Walking is a good choice. You also may want to do other activities, such as running, swimming, cycling, or playing tennis or team sports. Discuss any changes in your exercise program with your doctor. · Reach and stay at a healthy weight. This will lower your risk for many problems, such as obesity, diabetes, heart disease, and high blood pressure. · Do not smoke or allow others to smoke around you. If you need help quitting, talk to your doctor about stop-smoking programs and medicines. These can increase your chances of quitting for good. · Care for your mental health. It is easy to get weighed down by worry and stress. Learn strategies to manage stress, like deep breathing and mindfulness, and stay connected with your family and community.  If you find you often feel sad or hopeless, talk with your doctor. Treatment can help. · Talk to your doctor about whether you have any risk factors for sexually transmitted infections (STIs). You can help prevent STIs if you wait to have sex with a new partner (or partners) until you've each been tested for STIs. It also helps if you use condoms (male or female condoms) and if you limit your sex partners to one person who only has sex with you. Vaccines are available for some STIs, such as HPV. · Use birth control if it's important to you to prevent pregnancy. Talk with your doctor about the choices available and what might be best for you. · If you think you may have a problem with alcohol or drug use, talk to your doctor. This includes prescription medicines (such as amphetamines and opioids) and illegal drugs (such as cocaine and methamphetamine). Your doctor can help you figure out what type of treatment is best for you. · Protect your skin from too much sun. When you're outdoors from 10 a.m. to 4 p.m., stay in the shade or cover up with clothing and a hat with a wide brim. Wear sunglasses that block UV rays. Even when it's cloudy, put broad-spectrum sunscreen (SPF 30 or higher) on any exposed skin. · See a dentist one or two times a year for checkups and to have your teeth cleaned. · Wear a seat belt in the car. When should you call for help? Watch closely for changes in your health, and be sure to contact your doctor if you have any problems or symptoms that concern you. Where can you learn more? Go to http://www.Bit9.com/  Enter P072 in the search box to learn more about \"Well Visit, Ages 25 to 48: Care Instructions. \"  Current as of: February 11, 2021               Content Version: 13.0  © 3769-1011 Healthwise, Incorporated. Care instructions adapted under license by [employer] (which disclaims liability or warranty for this information).  If you have questions about a medical condition or this instruction, always ask your healthcare professional. [place] [address] any warranty or liability for your use of this information.

## 2021-12-02 NOTE — PROGRESS NOTES
Pete Horta is a 39 y.o. female who presents today for Complete Physical  .      She has a history of   Patient Active Problem List   Diagnosis Code    Type 2 diabetes mellitus with complication, without long-term current use of insulin ([facility] E11.8    Essential hypertension, benign I10    Tobacco abuse Z72.0    Ductal carcinoma in situ (DCIS) of left breast D05.12   . Today patient is here for complete physical exam.. Hypertension: Patient has been on lisinopril for some time. Today's blood pressure is a bit elevated. Home readings are: not being checked. DCIS: Recently diagnosed with lefts sided breast cancer. Status post lumpectomy. Has met with oncologist and has been started on tamoxifen. Overall she reports that she is doing ok. Tamoxifen seems to be having some joint issues. Often in R hip. ? Emotional problems as s/e of tamoxifen. DM: Follows with endocrinologist Dr. Kiersten Oglesyb. Reports that last blood work was done in Feb, A1c at 5.1. Still helping with weight. Patient is planning on doing cool sculpting. She does need a CBC drawn for them. Mood is a bit low. Going through a separation and exercising less. Seeing a therapist.  Reports that though her mood is low at times she does not feel clinically depressed. Does want to get back into a regular exercise routine. Health maintenance hx includes:  Exercise: less active. Form of exercise: None currently  Diet: generally follows a low fat low cholesterol diet  Social: Has quit smoking. At home with two children and living with parents.  for [facility]. Screening:    Colon cancer screening: ? Needs early screening due to genetics. Breast cancer screening: last mammogram 2021    Cervical cancer screening: last PAP/Pelvic exam: 2021   and was normal Dr. Nigel Quiñones.     Osteoporosis screening: N/A      Immunizations:     Immunization History   Administered Date(s) Administered    COVID-19, PFIZER, MRNA, LNP-S, PF, 30MCG/0.3ML DOSE 03/17/2021, 04/08/2021    Influenza Vaccine 11/20/2013    Influenza Vaccine (Quad) Mdck Pf (>2 Yrs Flucelvax QUAD [id number]) 10/05/2018, 09/29/2020, 09/30/2020    Influenza Vaccine (Quad) PF (>6 Mo Flulaval, Fluarix, and >3 Yrs Dorla Canter [id number]) 12/02/2021    Tdap 06/17/2014      Immunization status: up to date and documented, flu today. ROS  Review of Systems   Constitutional: Negative for chills, fever and weight loss. HENT: Negative for congestion and sore throat. Eyes: Negative for blurred vision, double vision and photophobia. Respiratory: Negative for cough and shortness of breath. Cardiovascular: Negative for chest pain, palpitations and leg swelling. Gastrointestinal: Negative for abdominal pain, constipation, diarrhea, heartburn, nausea and vomiting. Genitourinary: Negative for dysuria, frequency and urgency. Musculoskeletal: Positive for joint pain and myalgias. Skin: Negative for rash. Neurological: Negative. Negative for headaches. Endo/Heme/Allergies: Does not bruise/bleed easily. Psychiatric/Behavioral: Negative for memory loss and suicidal ideas. Mood low at times       Visit Vitals  /88   Pulse 82   Temp 97.1 °F (36.2 °C) (Temporal)   Resp 14   Ht 5' 3\" (1.6 m)   Wt 162 lb (73.5 kg)   SpO2 99%   BMI 28.70 kg/m²       Physical Exam  Constitutional:       General: She is not in acute distress. Appearance: She is well-developed. HENT:      Head: Normocephalic and atraumatic. Neck:      Thyroid: No thyromegaly. Cardiovascular:      Rate and Rhythm: Normal rate and regular rhythm. Heart sounds: No murmur heard. Pulmonary:      Effort: Pulmonary effort is normal.      Breath sounds: Normal breath sounds. No wheezing. Abdominal:      General: Bowel sounds are normal. There is no distension. Palpations: Abdomen is soft. Musculoskeletal:      Cervical back: Normal range of motion and neck supple.    Skin: General: Skin is warm and dry. Neurological:      Mental Status: She is alert and oriented to person, place, and time. Cranial Nerves: No cranial nerve deficit. Psychiatric:         Behavior: Behavior normal.           Current Outpatient Medications   Medication Sig    metFORMIN (GLUCOPHAGE) 1,000 mg tablet Take 1,000 mg by mouth two (2) times daily (with meals).  tamoxifen (NOLVADEX) 10 mg tablet Take 1 Tablet by mouth every other day.  liraglutide (VICTOZA 2-FERNANDO) 0.6 mg/0.1 mL (18 mg/3 mL) pnij 0.6 mg by SubCUTAneous route.  lisinopril (PRINIVIL, ZESTRIL) 10 mg tablet TAKE 1 TABLET BY MOUTH EVERY DAY     No current facility-administered medications for this visit. Past Medical History:   Diagnosis Date    Cancer [facility])     breast cancer    Essential hypertension, benign 2011    Essential hypertension, benign     Type II or unspecified type diabetes mellitus without mention of complication, not stated as uncontrolled 2011      Past Surgical History:   Procedure Laterality Date    [place] BREAST SURGERY PROCEDURE UNLISTED Left     7/15/2021      Social History     Tobacco Use    Smoking status: Former Smoker     Types: Cigarettes     Quit date: 2019     Years since quittin.9    Smokeless tobacco: Never Used   Substance Use Topics    Alcohol use: Yes     Comment: occasionally      Family History   Problem Relation Age of Onset    Diabetes Mother     Heart Disease Mother     Hypertension Mother     Diabetes Father     Diabetes Brother     Cancer Maternal Aunt     Cancer Maternal Grandmother         No Known Allergies     Assessment/Plan  Diagnoses and all orders for this visit:    1. Wellness examination- Dilma Torres was counseled on age-appropriate/ guideline-based risk prevention behaviors and screening for a 39y.o. year old   female . We also discussed adjustments in screening based on family history if necessary.    Printed instructions for preventative screening guidelines and healthy behaviors given to patient with after visit summary.    -     SED RATE (ESR); Future  -     C REACTIVE PROTEIN, QT; Future  -     CBC WITH AUTOMATED DIFF; Future  -     METABOLIC PANEL, COMPREHENSIVE; Future  -     TSH 3RD GENERATION; Future  -     REFERRAL TO GASTROENTEROLOGY    2. Type 2 diabetes mellitus with complication, without long-term current use of insulin (HCC)-done through endocrinologist.  Labs of been stable    3. Essential hypertension, benign-borderline, patient to monitor this at home  -     [address], COMPREHENSIVE; Future    4. Ductal carcinoma in situ (DCIS) of left breast-status post lumpectomy. Is on tamoxifen. Questionable mood and musculoskeletal complaints with this medication. She will follow up with oncology after the new year. Encouraged her to increase her physical activity to see if both of these improve seeing a therapist.    5. Mood changes-overall reports that she is doing okay. 6. Myalgia  -     SED RATE (ESR); Future  -     C REACTIVE PROTEIN, QT; Future  -     CBC WITH AUTOMATED DIFF; Future    7. Fatigue, unspecified type  -     TSH 3RD GENERATION; Future    8. Needs flu shot  -     [address] [place] [address] ANDERSON Riddle MD  12/2/2021    This note was created with the help of speech recognition software Rico Moralez) and may contain some 'sound alike' errors.

## 2022-02-19 LAB — HBA1C MFR BLD HPLC: 5.8 %

## 2022-02-28 ENCOUNTER — VIRTUAL VISIT (OUTPATIENT)
Dept: ONCOLOGY | Age: 42
End: 2022-02-28
Payer: COMMERCIAL

## 2022-02-28 DIAGNOSIS — D05.12 DUCTAL CARCINOMA IN SITU (DCIS) OF LEFT BREAST: Primary | ICD-10-CM

## 2022-02-28 PROCEDURE — 99213 OFFICE O/P EST LOW 20 MIN: CPT | Performed by: INTERNAL MEDICINE

## 2022-02-28 NOTE — PROGRESS NOTES
[facility] [place] at [address], [address] [facility] [address] [facility]  W: [phone]  F: [phone]        Reason for Visit:   Alanna Monson is a 43 y.o. female who is seen by synchronous (real-time) audio-video technology for follow up of DCIS    Breast surgeon:  Dr. Shamar Escoto onc:  Dr. Willie Nice    Treatment History:   · 5/25/21 L breast core bx:  DCIS, solid type, 1 mm, gr 2, ER + at 99%, OH + at 99%  · 7/15/21 L breast lumpectomy:  DCIS, 5-10 mm, gr 2, pTis pNx cM0  · 6/16/21 invitae genetic testing:  VUS MSH3; VUS SDHA    History of Present Illness: An abnormal mammogram led to the pathology above    Interval history:  First few months on tamoxifen were \"rough. \"  Fatigue, joint pain, swelling. Improved now, but not at baseline    FH:  Maternal aunt with breast cancer, paternal cousin with breast cancer; no ovarian, prostate, pancreas cancer    Past Medical History:   Diagnosis Date    Cancer [facility])     breast cancer    Essential hypertension, benign 4/7/2011    Essential hypertension, benign     Type II or unspecified type diabetes mellitus without mention of complication, not stated as uncontrolled 4/7/2011      Past Surgical History:   Procedure Laterality Date    [place] BREAST SURGERY PROCEDURE UNLISTED Left     7/15/2021      Social History     Tobacco Use    Smoking status: Former Smoker     Types: Cigarettes     Quit date: 1/1/2019     Years since quitting: 3.1    Smokeless tobacco: Never Used   Substance Use Topics    Alcohol use: Yes     Comment: occasionally      Family History   Problem Relation Age of Onset    Diabetes Mother     Heart Disease Mother     Hypertension Mother     Diabetes Father     Diabetes Brother     Cancer Maternal Aunt     Cancer Maternal Grandmother      Current Outpatient Medications   Medication Sig    metFORMIN (GLUCOPHAGE) 1,000 mg tablet Take 1,000 mg by mouth two (2) times daily (with meals).     tamoxifen (NOLVADEX) 10 mg tablet Take 1 Tablet by mouth every other day.  liraglutide (VICTOZA 2-FERNANDO) 0.6 mg/0.1 mL (18 mg/3 mL) pnij 0.6 mg by SubCUTAneous route.  lisinopril (PRINIVIL, ZESTRIL) 10 mg tablet TAKE 1 TABLET BY MOUTH EVERY DAY     No current facility-administered medications for this visit. No Known Allergies     Review of Systems: A complete review of systems was obtained, negative except as described above. Physical Exam:     There were no vitals taken for this visit. ECOG PS: 0    General: alert, cooperative, no distress   Mental  status: normal mood, behavior, speech, dress, motor activity, and thought processes, able to follow commands   HENT: NCAT   Neck: no visualized mass   Resp: no respiratory distress   Neuro: no gross deficits   Skin: no discoloration or lesions of concern on visible areas   Psychiatric: normal affect, consistent with stated mood, no evidence of hallucinations       Due to this being a TeleHealth evaluation (During [facility]- public health emergency), many elements of the physical examination are unable to be assessed. Evaluation of the following organ systems was limited: Vitals/Constitutional/EENT/Resp/CV/GI//MS/Neuro/Skin/Heme-Lymph-Imm. Results:     Lab Results   Component Value Date/Time    WBC 5.7 12/02/2021 10:56 AM    HGB 11.3 (L) 12/02/2021 10:56 AM    HCT 36.7 12/02/2021 10:56 AM    PLATELET 998 54/03/0109 10:56 AM    MCV 69.6 (L) 12/02/2021 10:56 AM    ABS.  NEUTROPHILS 3.2 12/02/2021 10:56 AM     Lab Results   Component Value Date/Time    Sodium 140 12/02/2021 10:56 AM    Potassium 4.0 12/02/2021 10:56 AM    Chloride 105 12/02/2021 10:56 AM    CO2 26 12/02/2021 10:56 AM    Glucose 105 (H) 12/02/2021 10:56 AM    BUN 11 12/02/2021 10:56 AM    Creatinine 0.56 12/02/2021 10:56 AM    GFR est AA >60 12/02/2021 10:56 AM    GFR est non-AA >60 12/02/2021 10:56 AM    Calcium 9.2 12/02/2021 10:56 AM     Lab Results   Component Value Date/Time    Bilirubin, total 0.4 12/02/2021 10:56 AM    ALT (SGPT) 24 12/02/2021 10:56 AM    Alk. phosphatase 31 (L) 12/02/2021 10:56 AM    Protein, total 7.4 12/02/2021 10:56 AM    Albumin 3.7 12/02/2021 10:56 AM    Globulin 3.7 12/02/2021 10:56 AM     6/11/21 MRI breast  10 mm area in L lateral breast, 8:00, 6cmfn    Records reviewed and summarized above. Pathology report(s) reviewed above. Radiology report(s) reviewed above. Assessment/plan:   1. Left DCIS, stage 0, ER +, AL +, grade 2:    The major issue for our consultation today was the use of tamoxifen in patients with DCIS that expresses estrogen and/or progesterone receptors. The following was discussed. The [employer] (nccn.org) guidelines suggest consideration of tamoxifen for women with DCIS treated with lumpectomy and radiation and who have hormone-receptor-positive tumors. Randomized trials in patients with DCIS suggest that tamoxifen would further reduce the risk of in-breast recurrence by about 30-40% in this patient, and decrease this patient's risk of contralateral breast cancer by about half. At present and from the literature, the risk of in-breast recurrence after radiation would probably be about 5-10 % at 10 years. Tamoxifen would decrease this risk by 30-40%. Her risk of contralateral breast cancer is 0.5-1% a year, and tamoxifen would also decrease that risk by about half. About half of recurrences are invasive and half DCIS. However, from the data available, tamoxifen has not been associated with an improvement in survival.     Also, discussed the [facility] 2018 data with tamoxifen 10 mg every other day for 3 years with similar results to historical data. After this discussion, she is agreeable to tamoxifen 10 mg every other day, rx in. Taking tamoxifen as above, was having terrible fatigue and joint pain, but those have improved. She would like to continue taking tamoxifen for now.     Sees Dr. Carlos Lainez again with mammogram in May 2022    Follow-up after early breast cancer was discussed. I recommend follow-up as defined by the American Society of Clinical Oncology and [facility] [[facility]]. This includes a visit to a health care professional every 3-6 months for 3 years, then every 6-12 months for 2 years, and then yearly as well as mammograms yearly. 2. Emotional well being:  She has excellent support and is coping well with her disease    3. VUS MSH3 and SDHA:  Not clinically relevant at this time, has seen genetics    S/p covid19 vaccination    The patient was evaluated through a synchronous (real-time) audio-video encounter. The patient (or guardian if applicable) is aware that this is a billable service, which includes applicable co-pays. This Virtual Visit was conducted with patient's (and/or legal guardian's) consent. The visit was conducted pursuant to the emergency declaration under the [address] [place] [address] authority and the [employer] and [employer] General Act. Patient identification was verified, and a caregiver was present when appropriate. The patient was located in a state where the provider was licensed to provide care. I appreciate the opportunity to participate in Ms. Gena Ibrahim's care. Signed By: Luci Gagnon MD      No orders of the defined types were placed in this encounter.

## 2022-03-19 PROBLEM — Z72.0 TOBACCO ABUSE: Status: ACTIVE | Noted: 2018-01-15

## 2022-03-20 PROBLEM — D05.12 DUCTAL CARCINOMA IN SITU (DCIS) OF LEFT BREAST: Status: ACTIVE | Noted: 2021-12-02

## 2022-05-26 ENCOUNTER — OFFICE VISIT (OUTPATIENT)
Dept: INTERNAL MEDICINE CLINIC | Age: 42
End: 2022-05-26
Payer: COMMERCIAL

## 2022-05-26 VITALS
WEIGHT: 169.4 LBS | HEIGHT: 63 IN | DIASTOLIC BLOOD PRESSURE: 73 MMHG | HEART RATE: 78 BPM | OXYGEN SATURATION: 98 % | RESPIRATION RATE: 18 BRPM | TEMPERATURE: 98.9 F | BODY MASS INDEX: 30.02 KG/M2 | SYSTOLIC BLOOD PRESSURE: 107 MMHG

## 2022-05-26 DIAGNOSIS — D05.12 DUCTAL CARCINOMA IN SITU (DCIS) OF LEFT BREAST: ICD-10-CM

## 2022-05-26 DIAGNOSIS — R41.840 ATTENTION DEFICIT: Primary | ICD-10-CM

## 2022-05-26 DIAGNOSIS — E11.8 TYPE 2 DIABETES MELLITUS WITH COMPLICATION, WITHOUT LONG-TERM CURRENT USE OF INSULIN (HCC): ICD-10-CM

## 2022-05-26 DIAGNOSIS — I10 ESSENTIAL HYPERTENSION, BENIGN: ICD-10-CM

## 2022-05-26 PROCEDURE — 99214 OFFICE O/P EST MOD 30 MIN: CPT | Performed by: INTERNAL MEDICINE

## 2022-05-26 NOTE — PATIENT INSTRUCTIONS
Dr. Wanda Mancini of [address] [address], [address]  [facility]          [phone] Medication:   Requested Prescriptions     Pending Prescriptions Disp Refills    montelukast (SINGULAIR) 10 MG tablet 30 tablet 2     Sig: TAKE ONE TABLET BY MOUTH DAILY     Last Filled:  10/7/21    Last appt: 11/15/2021   Next appt: Visit date not found    Last OARRS: No flowsheet data found.

## 2022-05-26 NOTE — PROGRESS NOTES
Nino Carranza  Identified pt with two pt identifiers(name and ). Chief Complaint   Patient presents with    Attention Deficit Disorder     RM19// pt presents today to discuss having ADD is having trouble concentrating       1. Have you been to the ER, urgent care clinic since your last visit? Hospitalized since your last visit? NO    2. Have you seen or consulted any other health care providers outside of the [address] [place] [address] since your last visit? Include any pap smears or colon screening. NO      Provider notified of reason for visit, vitals and flowsheets obtained on patients.      Patient received paperwork for advance directive during previous visit but has not completed at this time     Reviewed record In preparation for visit, huddled with provider and have obtained necessary documentation      Health Maintenance Due   Topic    Pneumococcal 0-64 years (1 - PCV)    MICROALBUMIN Q1     Breast Cancer Screen Mammogram     Lipid Screen        Wt Readings from Last 3 Encounters:   22 169 lb 6.4 oz (76.8 kg)   21 162 lb (73.5 kg)   21 167 lb 3.2 oz (75.8 kg)     Temp Readings from Last 3 Encounters:   22 98.9 °F (37.2 °C) (Oral)   21 97.1 °F (36.2 °C) (Temporal)   21 98.4 °F (36.9 °C) (Temporal)     BP Readings from Last 3 Encounters:   22 107/73   21 138/88   21 (!) 144/91     Pulse Readings from Last 3 Encounters:   22 78   21 82   21 75     Vitals:    22 1125   BP: 107/73   Pulse: 78   Resp: 18   Temp: 98.9 °F (37.2 °C)   TempSrc: Oral   SpO2: 98%   Weight: 169 lb 6.4 oz (76.8 kg)   Height: 5' 3\" (1.6 m)   PainSc:   0 - No pain   LMP: 2022         Learning Assessment:  :     Learning Assessment 2021   PRIMARY LEARNER Patient   PRIMARY LANGUAGE ENGLISH   LEARNER PREFERENCE PRIMARY LISTENING   ANSWERED BY patient   RELATIONSHIP SELF       Depression Screening:  :     3 most recent PHQ Screens 2022   Little interest or pleasure in doing things Not at all   Feeling down, depressed, irritable, or hopeless Not at all   Total Score PHQ 2 0       Fall Risk Assessment:  :     Fall Risk Assessment, last 12 mths 1/15/2018   Able to walk? Yes   Fall in past 12 months? No       Abuse Screening:  :     Abuse Screening Questionnaire 1/15/2018   Do you ever feel afraid of your partner? N   Are you in a relationship with someone who physically or mentally threatens you? N   Is it safe for you to go home? Y       ADL Screening:  :     ADL Assessment 12/2/2021   Feeding yourself No Help Needed   Getting from bed to chair No Help Needed   Getting dressed No Help Needed   Bathing or showering No Help Needed   Walk across the room (includes cane/walker) No Help Needed   Using the telphone No Help Needed   Taking your medications No Help Needed   Preparing meals No Help Needed   Managing money (expenses/bills) No Help Needed   Moderately strenuous housework (laundry) No Help Needed   Shopping for personal items (toiletries/medicines) No Help Needed   Shopping for groceries No Help Needed   Driving No Help Needed   Climbing a flight of stairs No Help Needed   Getting to places beyond walking distances No Help Needed         Medication reconciliation up to date and corrected with patient at this time.

## 2022-05-26 NOTE — PROGRESS NOTES
Danitza Torres is a 43 y.o. female who presents today for Attention Deficit Disorder (RM19// pt presents today to discuss having ADD is having trouble concentrating)  . She has a history of   Patient Active Problem List   Diagnosis Code    Type 2 diabetes mellitus with complication, without long-term current use of insulin (HCC) E11.8    Essential hypertension, benign I10    Tobacco abuse Z72.0    Ductal carcinoma in situ (DCIS) of left breast D05.12   . Today patient is here for an acute visit. .     Patient is concerned regarding her attention span. She feels as though she is not able to pay attention to things and stay on task. She wonders about ADD. Reports she has mild been tested for ADD. We did check thyroid studies late last year which were normal.  Patient does have ADD in the family. She reports that this was not a problem during her previous job but did not require a lot of concentration, but her job over the last 2 years has been difficult for her due to lack of concentration. CBC and CMP were also both normal with the exception of very mild anemia. .    Hypertension- Has been variable. Today blood pressure is well controlled. Hypertension ROS: taking medications as instructed, no medication side effects noted, no TIA's, no chest pain on exertion, no dyspnea on exertion, no swelling of ankles     reports that she quit smoking about 3 years ago. Her smoking use included cigarettes. She has never used smokeless tobacco.    reports current alcohol use. BP Readings from Last 2 Encounters:   05/26/22 107/73   12/02/21 138/88     Diabetes: Patient is seeing Dr. Alexandre Stone. A1c was 5.8 in February of this year. She remains on Victoza. Her lipid panel was also done at that time and her LDL was at 119. DCIS: MMG is UTD. Was just done this week. Has decided to continue tamoxifen for time being. Continues to follow with oncology.     ROS  Review of Systems   Constitutional: Negative for chills, fever and weight loss. HENT: Negative for congestion and sore throat. Eyes: Negative for blurred vision, double vision and photophobia. Respiratory: Negative for cough and shortness of breath. Cardiovascular: Negative for chest pain, palpitations and leg swelling. Gastrointestinal: Negative for abdominal pain, constipation, diarrhea, heartburn, nausea and vomiting. Genitourinary: Negative for dysuria, frequency and urgency. Musculoskeletal: Negative for joint pain and myalgias. Skin: Negative for rash. Neurological: Negative. Negative for headaches. Endo/Heme/Allergies: Does not bruise/bleed easily. Psychiatric/Behavioral: Negative for memory loss and suicidal ideas. Attention problems. Visit Vitals  /73 (BP 1 Location: Left upper arm, BP Patient Position: Sitting, BP Cuff Size: Large adult)   Pulse 78   Temp 98.9 °F (37.2 °C) (Oral)   Resp 18   Ht 5' 3\" (1.6 m)   Wt 169 lb 6.4 oz (76.8 kg)   SpO2 98%   BMI 30.01 kg/m²       Physical Exam  Constitutional:       Appearance: She is well-developed. HENT:      Head: Normocephalic and atraumatic. Cardiovascular:      Rate and Rhythm: Normal rate and regular rhythm. Heart sounds: No murmur heard. Pulmonary:      Effort: Pulmonary effort is normal. No respiratory distress. Skin:     General: Skin is warm and dry. Neurological:      Mental Status: She is alert and oriented to person, place, and time. Psychiatric:         Behavior: Behavior normal.           Current Outpatient Medications   Medication Sig    metFORMIN (GLUCOPHAGE) 1,000 mg tablet Take 1,000 mg by mouth two (2) times daily (with meals).  tamoxifen (NOLVADEX) 10 mg tablet Take 1 Tablet by mouth every other day.  liraglutide (VICTOZA 2-FERNANDO) 0.6 mg/0.1 mL (18 mg/3 mL) pnij 0.6 mg by SubCUTAneous route.     lisinopril (PRINIVIL, ZESTRIL) 10 mg tablet TAKE 1 TABLET BY MOUTH EVERY DAY     No current facility-administered medications for this visit. Past Medical History:   Diagnosis Date    Cancer [facility])     breast cancer    Essential hypertension, benign 4/7/2011    Essential hypertension, benign     Type II or unspecified type diabetes mellitus without mention of complication, not stated as uncontrolled 4/7/2011      Past Surgical History:   Procedure Laterality Date    [place] BREAST SURGERY PROCEDURE UNLISTED Left     7/15/2021      Social History     Tobacco Use    Smoking status: Former Smoker     Types: Cigarettes     Quit date: 1/1/2019     Years since quitting: 3.4    Smokeless tobacco: Never Used   Substance Use Topics    Alcohol use: Yes     Comment: occasionally      Family History   Problem Relation Age of Onset    Diabetes Mother     Heart Disease Mother     Hypertension Mother     Diabetes Father     Diabetes Brother     Cancer Maternal Aunt     Cancer Maternal Grandmother         No Known Allergies     Assessment/Plan  Diagnoses and all orders for this visit:    1. Attention deficit-patient having difficulty staying on task with her new job. This is causing some problems at work. She does have a family history of ADD. She would like to have some formal neuropsych evaluation. Will refer to [facility] for neuropsych testing.  -     REFERRAL TO NEUROPSYCHOLOGY    2. Type 2 diabetes mellitus with complication, without long-term current use of insulin (HCC)-monitored through endocrine. Will be seeing them soon. Very well controlled    3. Ductal carcinoma in situ (DCIS) of left breast-now tolerating tamoxifen. 4. Essential hypertension, benign-has been quite variable recently. Suggest that she checks this at home regularly and let us know if her average is above 135/85. Tessy Reddy MD  5/26/2022    This note was created with the help of speech recognition software Jayashree Ledbetter) and may contain some 'sound alike' errors.

## 2022-08-28 DIAGNOSIS — D05.12 DUCTAL CARCINOMA IN SITU (DCIS) OF LEFT BREAST: ICD-10-CM

## 2022-08-31 RX ORDER — TAMOXIFEN CITRATE 10 MG/1
10 TABLET, FILM COATED ORAL EVERY OTHER DAY
Qty: 45 TABLET | Refills: 3 | Status: SHIPPED | OUTPATIENT
Start: 2022-08-31

## 2022-09-12 ENCOUNTER — OFFICE VISIT (OUTPATIENT)
Dept: INTERNAL MEDICINE CLINIC | Age: 42
End: 2022-09-12
Payer: COMMERCIAL

## 2022-09-12 VITALS
OXYGEN SATURATION: 97 % | HEIGHT: 63 IN | WEIGHT: 164 LBS | TEMPERATURE: 99.3 F | SYSTOLIC BLOOD PRESSURE: 121 MMHG | DIASTOLIC BLOOD PRESSURE: 86 MMHG | RESPIRATION RATE: 16 BRPM | HEART RATE: 75 BPM | BODY MASS INDEX: 29.06 KG/M2

## 2022-09-12 DIAGNOSIS — E11.8 TYPE 2 DIABETES MELLITUS WITH COMPLICATION, WITHOUT LONG-TERM CURRENT USE OF INSULIN (HCC): ICD-10-CM

## 2022-09-12 DIAGNOSIS — D05.12 DUCTAL CARCINOMA IN SITU (DCIS) OF LEFT BREAST: ICD-10-CM

## 2022-09-12 DIAGNOSIS — I10 ESSENTIAL HYPERTENSION, BENIGN: ICD-10-CM

## 2022-09-12 DIAGNOSIS — Z01.818 PREOP EXAMINATION: Primary | ICD-10-CM

## 2022-09-12 PROCEDURE — 93000 ELECTROCARDIOGRAM COMPLETE: CPT | Performed by: INTERNAL MEDICINE

## 2022-09-12 PROCEDURE — 99214 OFFICE O/P EST MOD 30 MIN: CPT | Performed by: INTERNAL MEDICINE

## 2022-09-12 RX ORDER — SEMAGLUTIDE 1.34 MG/ML
INJECTION, SOLUTION SUBCUTANEOUS
COMMUNITY
Start: 2022-07-06

## 2022-09-12 NOTE — PROGRESS NOTES
Kathryn Sharma  Identified pt with two pt identifiers(name and ). Chief Complaint   Patient presents with    Pre-op Exam     RM18// pt presenting today to have blood work and an EKG to schedule Tubal Ligation and D&C        1. Have you been to the ER, urgent care clinic since your last visit? Hospitalized since your last visit? NO    2. Have you seen or consulted any other health care providers outside of the [address] [place] [address] since your last visit? Include any pap smears or colon screening. NO      Provider notified of reason for visit, vitals and flowsheets obtained on patients.      Patient received paperwork for advance directive during previous visit but has not completed at this time     Reviewed record In preparation for visit, huddled with provider and have obtained necessary documentation      Health Maintenance Due   Topic    Pneumococcal 0-64 years (1 - PCV)    MICROALBUMIN Q1     Breast Cancer Screen Mammogram     Lipid Screen     Flu Vaccine (1)       Wt Readings from Last 3 Encounters:   22 164 lb (74.4 kg)   22 169 lb 6.4 oz (76.8 kg)   21 162 lb (73.5 kg)     Temp Readings from Last 3 Encounters:   22 99.3 °F (37.4 °C) (Oral)   22 98.9 °F (37.2 °C) (Oral)   21 97.1 °F (36.2 °C) (Temporal)     BP Readings from Last 3 Encounters:   22 121/86   22 107/73   21 138/88     Pulse Readings from Last 3 Encounters:   22 75   22 78   21 82     Vitals:    22 1319   BP: 121/86   Pulse: 75   Resp: 16   Temp: 99.3 °F (37.4 °C)   TempSrc: Oral   SpO2: 97%   Weight: 164 lb (74.4 kg)   Height: 5' 3\" (1.6 m)   PainSc:   0 - No pain   LMP: 2022         Learning Assessment:  :     Learning Assessment 2021   PRIMARY LEARNER Patient   PRIMARY LANGUAGE ENGLISH   LEARNER PREFERENCE PRIMARY LISTENING   ANSWERED BY patient   RELATIONSHIP SELF       Depression Screening:  :     3 most recent PHQ Screens 2022   Little interest or pleasure in doing things Not at all   Feeling down, depressed, irritable, or hopeless Not at all   Total Score PHQ 2 0       Fall Risk Assessment:  :     Fall Risk Assessment, last 12 mths 1/15/2018   Able to walk? Yes   Fall in past 12 months? No       Abuse Screening:  :     Abuse Screening Questionnaire 1/15/2018   Do you ever feel afraid of your partner? N   Are you in a relationship with someone who physically or mentally threatens you? N   Is it safe for you to go home? Y       ADL Screening:  :     ADL Assessment 12/2/2021   Feeding yourself No Help Needed   Getting from bed to chair No Help Needed   Getting dressed No Help Needed   Bathing or showering No Help Needed   Walk across the room (includes cane/walker) No Help Needed   Using the telphone No Help Needed   Taking your medications No Help Needed   Preparing meals No Help Needed   Managing money (expenses/bills) No Help Needed   Moderately strenuous housework (laundry) No Help Needed   Shopping for personal items (toiletries/medicines) No Help Needed   Shopping for groceries No Help Needed   Driving No Help Needed   Climbing a flight of stairs No Help Needed   Getting to places beyond walking distances No Help Needed         Medication reconciliation up to date and corrected with patient at this time.

## 2022-09-12 NOTE — PROGRESS NOTES
Jerman Little is a 43 y.o. female who presents today for Pre-op Exam (RM18// pt presenting today to have blood work and an EKG to schedule Tubal Ligation and D&C )  . She has a history of   Patient Active Problem List   Diagnosis Code    Type 2 diabetes mellitus with complication, without long-term current use of insulin ([facility] [address] E11.8    Essential hypertension, benign I10    Tobacco abuse Z72.0    Ductal carcinoma in situ (DCIS) of left breast D05.12   . Today patient is here for preop evaluation. she does not have other concerns. Patient will be having a bilateral tubal ligation and D&C done at their surgical center by Genoveva Stokes. They are requesting a preop EKG and blood work. No problems with anesthesia in the past.     Hypertension- very well controlled. Hypertension ROS: taking medications as instructed, no medication side effects noted, no TIA's, no chest pain on exertion, no dyspnea on exertion, no swelling of ankles     reports that she quit smoking about 3 years ago. Her smoking use included cigarettes. She has never used smokeless tobacco.    reports current alcohol use. BP Readings from Last 2 Encounters:   09/12/22 121/86   05/26/22 107/73     A1c was at 5.8 in July. Creatinine was normal at that time. Still on a GLP-1.    ROS  Review of Systems   Constitutional:  Negative for chills, fever and weight loss. HENT:  Negative for congestion and sore throat. Eyes:  Negative for blurred vision, double vision and photophobia. Respiratory:  Negative for cough and shortness of breath. Cardiovascular:  Negative for chest pain, palpitations and leg swelling. Gastrointestinal:  Negative for abdominal pain, constipation, diarrhea, heartburn, nausea and vomiting. Genitourinary:  Negative for dysuria, frequency and urgency. Musculoskeletal:  Negative for joint pain and myalgias. Skin:  Negative for rash. Neurological: Negative. Negative for headaches.    Endo/Heme/Allergies: Does not bruise/bleed easily. Psychiatric/Behavioral:  Negative for memory loss and suicidal ideas. Visit Vitals  /86 (BP 1 Location: Left upper arm, BP Patient Position: Sitting, BP Cuff Size: Large adult)   Pulse 75   Temp 99.3 °F (37.4 °C) (Oral)   Resp 16   Ht 5' 3\" (1.6 m)   Wt 164 lb (74.4 kg)   SpO2 97%   BMI 29.05 kg/m²       Physical Exam  Constitutional:       Appearance: She is well-developed. HENT:      Head: Normocephalic and atraumatic. Right Ear: Tympanic membrane normal.      Left Ear: Tympanic membrane normal.      Nose: Nose normal.   Cardiovascular:      Rate and Rhythm: Normal rate and regular rhythm. Heart sounds: No murmur heard. Pulmonary:      Effort: Pulmonary effort is normal. No respiratory distress. Skin:     General: Skin is warm and dry. Neurological:      Mental Status: She is alert and oriented to person, place, and time. Psychiatric:         Behavior: Behavior normal.         Current Outpatient Medications   Medication Sig    Ozempic 1 mg/dose (4 mg/3 mL) pnij INJECT 1MG UNDER THE SKIN ONCE A WEEK    tamoxifen (NOLVADEX) 10 mg tablet TAKE 1 TABLET BY MOUTH EVERY OTHER DAY    metFORMIN (GLUCOPHAGE) 1,000 mg tablet Take 1,000 mg by mouth two (2) times daily (with meals). lisinopril (PRINIVIL, ZESTRIL) 10 mg tablet TAKE 1 TABLET BY MOUTH EVERY DAY     No current facility-administered medications for this visit.         Past Medical History:   Diagnosis Date    Cancer [facility])     breast cancer    Essential hypertension, benign 4/7/2011    Essential hypertension, benign     Type II or unspecified type diabetes mellitus without mention of complication, not stated as uncontrolled 4/7/2011      Past Surgical History:   Procedure Laterality Date    AL BREAST SURGERY PROCEDURE UNLISTED Left     7/15/2021      Social History     Tobacco Use    Smoking status: Former     Types: Cigarettes     Quit date: 1/1/2019     Years since quitting: 3.6    Smokeless tobacco: Never   Substance Use Topics    Alcohol use: Yes     Comment: occasionally      Family History   Problem Relation Age of Onset    Diabetes Mother     Heart Disease Mother     Hypertension Mother     Diabetes Father     Diabetes Brother     Cancer Maternal Aunt     Cancer Maternal Grandmother         No Known Allergies     Assessment/Plan  Diagnoses and all orders for this visit:    1. Preop examination-patient is stable to proceed with elective procedure. We will fax EKG and lab results to OB/GYN  -     AMB POC EKG ROUTINE W/ 12 LEADS, INTER & REP  -     METABOLIC PANEL, BASIC; Future    2. Type 2 diabetes mellitus with complication, without long-term current use of insulin (HCC)-sees endocrine. A1c below 6    3. Ductal carcinoma in situ (DCIS) of left breast-continues to follow with oncology. 4. Essential hypertension, benign-stable          Fely Abrams MD  9/12/2022    This note was created with the help of speech recognition software Lynda Peters) and may contain some 'sound alike' errors.

## 2022-09-13 LAB
ANION GAP SERPL CALC-SCNC: 8 MMOL/L (ref 5–15)
BUN SERPL-MCNC: 10 MG/DL (ref 6–20)
BUN/CREAT SERPL: 17 (ref 12–20)
CALCIUM SERPL-MCNC: 9 MG/DL (ref 8.5–10.1)
CHLORIDE SERPL-SCNC: 106 MMOL/L (ref 97–108)
CO2 SERPL-SCNC: 23 MMOL/L (ref 21–32)
CREAT SERPL-MCNC: 0.59 MG/DL (ref 0.55–1.02)
GLUCOSE SERPL-MCNC: 104 MG/DL (ref 65–100)
POTASSIUM SERPL-SCNC: 4.5 MMOL/L (ref 3.5–5.1)
SODIUM SERPL-SCNC: 137 MMOL/L (ref 136–145)

## 2023-02-21 ENCOUNTER — TELEPHONE (OUTPATIENT)
Dept: INTERNAL MEDICINE CLINIC | Age: 43
End: 2023-02-21

## 2023-02-21 NOTE — TELEPHONE ENCOUNTER
02/21/2023--This user called and spoke with the patient, she stated that she had a URI about 3 weeks ago that she had not fully gotten over and then tested positive for Covid yesterday. Patient reports sneezing, runny nose, headache, slight cough and denies any SOB or fevers. Patient stated that she saw Boykin Crea about 1 week ago and blood work was done and she was told that her liver function was elevated and to not drink (Which patient stated that she had not been because she had been sick) and to be careful with what medication's she takes. Patient stated that she just feels like she has a cold, but with her liver function being elevated, she is scared to take anything and is unsure of what she can or cannot take. I let her know that I would request those labs/office notes from 's office and consult with the doctor and then give her a call back with recommendation's. She verbalized understanding and had no further question's or concerns at that time.

## 2023-02-21 NOTE — TELEPHONE ENCOUNTER
02/21/2023--This user called and spoke with the patient and let her know our recommendation's. Patient verbalized understanding and all question's were answered at that time.

## 2023-02-21 NOTE — TELEPHONE ENCOUNTER
Patient was calling to tell us she tested positive for COVID yesterday - patient is concerned and wants to know what she should be taking for this, if anything  She states she is a high risk patient and recently saw her endocrinologist [Dr Katie Crawford who told her her liver function was not great at this moment so she's hesitant on taking medications if she does not need them  Patient requesting to speak with nurse regarding her situation

## 2023-02-28 ENCOUNTER — OFFICE VISIT (OUTPATIENT)
Dept: ONCOLOGY | Age: 43
End: 2023-02-28
Payer: COMMERCIAL

## 2023-02-28 VITALS
RESPIRATION RATE: 16 BRPM | DIASTOLIC BLOOD PRESSURE: 82 MMHG | OXYGEN SATURATION: 97 % | WEIGHT: 161 LBS | BODY MASS INDEX: 28.52 KG/M2 | SYSTOLIC BLOOD PRESSURE: 135 MMHG | HEART RATE: 88 BPM | TEMPERATURE: 97.4 F

## 2023-02-28 DIAGNOSIS — D05.12 DUCTAL CARCINOMA IN SITU (DCIS) OF LEFT BREAST: Primary | ICD-10-CM

## 2023-02-28 DIAGNOSIS — E11.8 TYPE 2 DIABETES MELLITUS WITH COMPLICATION, WITHOUT LONG-TERM CURRENT USE OF INSULIN (HCC): ICD-10-CM

## 2023-02-28 PROCEDURE — 3075F SYST BP GE 130 - 139MM HG: CPT | Performed by: INTERNAL MEDICINE

## 2023-02-28 PROCEDURE — 3079F DIAST BP 80-89 MM HG: CPT | Performed by: INTERNAL MEDICINE

## 2023-02-28 PROCEDURE — 99213 OFFICE O/P EST LOW 20 MIN: CPT | Performed by: INTERNAL MEDICINE

## 2023-02-28 NOTE — PROGRESS NOTES
Cornelius Escobar is a 37 y.o. female here today to follow up for breast cancer. 1. Have you been to the ER, urgent care clinic since your last visit? Hospitalized since your last visit? no    2. Have you seen or consulted any other health care providers outside of the [address] [place] [address] since your last visit? Include any pap smears or colon screening.   no

## 2023-02-28 NOTE — PROGRESS NOTES
[facility] [place] at [address], [address] [facility] Gun: [phone]  F: [phone]    Reason for Visit:   Kev Vidal is a 37 y.o. female who is seen for follow up of DCIS    Breast surgeon:  Dr. Constantin Gonsalez onc:  Dr. Alex White    Treatment History:   21 L breast core bx:  DCIS, solid type, 1 mm, gr 2, ER + at 99%, NC + at 99%  7/15/21 L breast lumpectomy:  DCIS, 5-10 mm, gr 2, pTis pNx cM0  21 invitae genetic testing:  VUS MSH3; VUS SDHA  Tamoxifen 10mg every other day - 2021 - current    History of Present Illness: An abnormal mammogram led to the pathology above    Interval history: Patient presents today for follow up on tamoxifen. Reports gr 1 fatigue, gr 1 hair loss, gr 1 hot flashes, gr 1 itching, gr 1 edema, gr 1 loss of libido, gr 1 vaginal dryness. Eye doctor: 2022 and sees annually     LMP: 2/15/23    FH:   Maternal aunt with breast cancer, paternal cousin with breast cancer; no ovarian, prostate, pancreas cancer    Past Medical History:   Diagnosis Date    Cancer [facility])     breast cancer    Essential hypertension, benign 2011    Essential hypertension, benign     Type II or unspecified type diabetes mellitus without mention of complication, not stated as uncontrolled 2011      Past Surgical History:   Procedure Laterality Date    HX TUBAL LIGATION N/A 2022    with DNC for polpys-Dr Ghislaine German    NC UNLISTED PROCEDURE BREAST Left     7/15/2021      Social History     Tobacco Use    Smoking status: Former     Types: Cigarettes     Quit date: 2019     Years since quittin.1    Smokeless tobacco: Never   Substance Use Topics    Alcohol use: Yes     Comment: occasionally      Family History   Problem Relation Age of Onset    Diabetes Mother     Heart Disease Mother     Hypertension Mother     Diabetes Father     Diabetes Brother     Cancer Maternal Aunt     Cancer Maternal Grandmother      Current Outpatient Medications   Medication Sig    Ozempic 1 mg/dose (4 mg/3 mL) pnij INJECT 1MG UNDER THE SKIN ONCE A WEEK    tamoxifen (NOLVADEX) 10 mg tablet TAKE 1 TABLET BY MOUTH EVERY OTHER DAY    metFORMIN (GLUCOPHAGE) 1,000 mg tablet Take 1,000 mg by mouth two (2) times daily (with meals). lisinopril (PRINIVIL, ZESTRIL) 10 mg tablet TAKE 1 TABLET BY MOUTH EVERY DAY     No current facility-administered medications for this visit. No Known Allergies     Review of Systems: A complete review of systems was obtained, negative except as described above. Physical Exam:     Visit Vitals  /82 (BP 1 Location: Right arm, BP Patient Position: Sitting)   Pulse 88   Temp 97.4 °F (36.3 °C) (Temporal)   Resp 16   Wt 161 lb (73 kg)   SpO2 97%   BMI 28.52 kg/m²     ECOG PS: 0    General: alert, cooperative, no distress   Mental  status: normal mood, behavior, speech, dress, motor activity, and thought processes, able to follow commands   HENT: NCAT   Neck: no visualized mass   Resp: no respiratory distress   Neuro: no gross deficits   Skin: no discoloration or lesions of concern on visible areas   Psychiatric: normal affect, consistent with stated mood, no evidence of hallucinations         Results:     Lab Results   Component Value Date/Time    WBC 5.7 12/02/2021 10:56 AM    HGB 11.3 (L) 12/02/2021 10:56 AM    HCT 36.7 12/02/2021 10:56 AM    PLATELET 032 71/61/1354 10:56 AM    MCV 69.6 (L) 12/02/2021 10:56 AM    ABS.  NEUTROPHILS 3.2 12/02/2021 10:56 AM     Lab Results   Component Value Date/Time    Sodium 137 09/12/2022 02:01 PM    Potassium 4.5 09/12/2022 02:01 PM    Chloride 106 09/12/2022 02:01 PM    CO2 23 09/12/2022 02:01 PM    Glucose 104 (H) 09/12/2022 02:01 PM    BUN 10 09/12/2022 02:01 PM    Creatinine 0.59 09/12/2022 02:01 PM    GFR est AA >60 09/12/2022 02:01 PM    GFR est non-AA >60 09/12/2022 02:01 PM    Calcium 9.0 09/12/2022 02:01 PM     Lab Results   Component Value Date/Time    Bilirubin, total 0.4 12/02/2021 10:56 AM    ALT (SGPT) 24 12/02/2021 10:56 AM    Alk. phosphatase 31 (L) 12/02/2021 10:56 AM    Protein, total 7.4 12/02/2021 10:56 AM    Albumin 3.7 12/02/2021 10:56 AM    Globulin 3.7 12/02/2021 10:56 AM     6/11/21 MRI breast  10 mm area in L lateral breast, 8:00, 6cmfn    Records reviewed and summarized above. Pathology report(s) reviewed above. Radiology report(s) reviewed above. Assessment/plan:   1. Left DCIS, stage 0, ER +, [place] +, grade 2:    The major issue for our consultation today was the use of tamoxifen in patients with DCIS that expresses estrogen and/or progesterone receptors. The following was discussed. The [employer] (nccn.org) guidelines suggest consideration of tamoxifen for women with DCIS treated with lumpectomy and radiation and who have hormone-receptor-positive tumors. Randomized trials in patients with DCIS suggest that tamoxifen would further reduce the risk of in-breast recurrence by about 30-40% in this patient, and decrease this patient's risk of contralateral breast cancer by about half. At present and from the literature, the risk of in-breast recurrence after radiation would probably be about 5-10 % at 10 years. Tamoxifen would decrease this risk by 30-40%. Her risk of contralateral breast cancer is 0.5-1% a year, and tamoxifen would also decrease that risk by about half. About half of recurrences are invasive and half DCIS. However, from the data available, tamoxifen has not been associated with an improvement in survival.     Also, discussed the [facility] 2018 data with tamoxifen 10 mg every other day for 3 years with similar results to historical data. After this discussion, she is agreeable to tamoxifen 10 mg every other day. She started about 9/2021 and tolerating well. Will complete therapy 9/2024. Sees Dr. Bland Kumar again with mammogram May 2023    Follow-up after early breast cancer was discussed.  I recommend follow-up as defined by the American Society of Clinical Oncology and [facility]. This includes a visit to a health care professional every 3-6 months for 3 years, then every 6-12 months for 2 years, and then yearly as well as mammograms yearly. 2. Emotional well being:  She has excellent support and is coping well with her disease    3. VUS MSH3 and SDHA:  Not clinically relevant at this time, has seen genetics    4. Uterine polyps: she reports tubal ligation and D&C with Dr. Gregory Messina. Negative per patient but will request records. She will continue with annual exams. 5. Vaginal dryness: discussed replens    6. Hot flashes: mild and intermittent, does not feel she needs pharmaceutical intervention at this time. 7. DM2: on metformin and ozempic, tolerating well. Following with endocrinology. S/p covid19 vaccination    I personally saw and evaluated the patient and performed the entire medical decision making. The history, physical exam, and documentation were performed by Virgil Macdonald NP. I reviewed and verified the above documentation and modified it as needed. L breast DCIS, ER +, on tamoxifen 10 mg every other day, tolerating well. Will obtain records on uterine polyps. Mammogram with Dr. Mimi Bamberger in May. RTC 1 year    I appreciate the opportunity to participate in Ms. Gena Ibrahim's care. Signed By: Teo Reddy MD      No orders of the defined types were placed in this encounter.

## 2023-09-04 DIAGNOSIS — D05.12 INTRADUCTAL CARCINOMA IN SITU OF LEFT BREAST: ICD-10-CM

## 2023-09-06 RX ORDER — TAMOXIFEN CITRATE 10 MG/1
TABLET ORAL
Qty: 45 TABLET | Refills: 3 | Status: SHIPPED | OUTPATIENT
Start: 2023-09-06

## 2024-07-22 ENCOUNTER — TELEPHONE (OUTPATIENT)
Age: 44
End: 2024-07-22

## 2024-09-16 ENCOUNTER — OFFICE VISIT (OUTPATIENT)
Age: 44
End: 2024-09-16

## 2024-09-16 VITALS
DIASTOLIC BLOOD PRESSURE: 92 MMHG | TEMPERATURE: 98.1 F | SYSTOLIC BLOOD PRESSURE: 138 MMHG | WEIGHT: 161 LBS | OXYGEN SATURATION: 99 % | BODY MASS INDEX: 28.53 KG/M2 | HEIGHT: 63 IN | HEART RATE: 80 BPM

## 2024-09-16 DIAGNOSIS — D05.12 INTRADUCTAL CARCINOMA IN SITU OF LEFT BREAST: Primary | ICD-10-CM

## 2024-09-16 PROCEDURE — 3080F DIAST BP >= 90 MM HG: CPT | Performed by: NURSE PRACTITIONER

## 2024-09-16 PROCEDURE — 3075F SYST BP GE 130 - 139MM HG: CPT | Performed by: NURSE PRACTITIONER

## 2024-09-16 PROCEDURE — 99213 OFFICE O/P EST LOW 20 MIN: CPT | Performed by: NURSE PRACTITIONER

## 2024-09-16 PROCEDURE — 99213 OFFICE O/P EST LOW 20 MIN: CPT | Performed by: INTERNAL MEDICINE

## 2024-09-16 RX ORDER — VENLAFAXINE HYDROCHLORIDE 75 MG/1
CAPSULE, EXTENDED RELEASE ORAL
COMMUNITY
Start: 2024-07-15

## 2024-09-16 RX ORDER — ATORVASTATIN CALCIUM 20 MG/1
20 TABLET, FILM COATED ORAL EVERY OTHER DAY
COMMUNITY
Start: 2024-07-13 | End: 2024-10-11

## 2024-09-16 RX ORDER — TIRZEPATIDE 12.5 MG/.5ML
INJECTION, SOLUTION SUBCUTANEOUS
COMMUNITY
Start: 2024-07-01